# Patient Record
Sex: MALE
[De-identification: names, ages, dates, MRNs, and addresses within clinical notes are randomized per-mention and may not be internally consistent; named-entity substitution may affect disease eponyms.]

---

## 2022-01-28 ENCOUNTER — HOSPITAL ENCOUNTER (OUTPATIENT)
Dept: HOSPITAL 33 - SDC | Age: 47
Discharge: HOME | End: 2022-01-28
Attending: FAMILY MEDICINE
Payer: COMMERCIAL

## 2022-01-28 VITALS — SYSTOLIC BLOOD PRESSURE: 134 MMHG | HEART RATE: 63 BPM | DIASTOLIC BLOOD PRESSURE: 75 MMHG | OXYGEN SATURATION: 98 %

## 2022-01-28 DIAGNOSIS — Z80.9: ICD-10-CM

## 2022-01-28 DIAGNOSIS — K62.5: Primary | ICD-10-CM

## 2022-01-28 NOTE — OP
SURGERY DATE/TIME:  01/28/2022  0802    



PREOPERATIVE DIAGNOSIS:      Rectal bleeding.



POSTOPERATIVE DIAGNOSIS:      Normal colon.



PROCEDURE:    Colonoscopy.



SURGEON:        Dr. Mujica.



ANESTHESIA:    MAC. Medications given by anesthesia department. 



HISTORY: The patient is a 47-year-old white male who reports he has been having 
intermittent rectal bleeding for up to two years. He denies any change in his stools 
otherwise. There is cancer in his family but he does not remember anybody particularly who 
had colon cancer. The patient was felt to need to have endoscopic evaluation and was 
appraised of the risks of the procedure including the risk of perforation, phlebitis, 
untoward reaction to medication, bleeding and missed lesions. The patient verbalized his 
understanding and desired to have the procedure performed.



DESCRIPTION OF PROCEDURE:  The patient was given the medications by the anesthesia 
department. He had continuous pulse oximetry, ECG monitoring, intermittent blood pressure 
monitoring during the examination. He was placed in the left lateral decubitus position. A 
digital rectal examination was performed and revealed normal anal sphincter tone, no 
masses, no significant hemorrhoids and normal prostate.  The flexible Olympus pediatric 
colonoscope was used to intubate the rectum. A view of the colon was developed 
sequentially to the cecum appeared to be approximately 20 cm into the terminal ileum.  
Upon insertion and withdrawal, including a retroflex view in the rectum, no mucosal 
lesions were encountered.  The scope was removed from the patient who tolerated the 
procedure well and was sent back to OP recovery in good condition. The prep was noted to 
be fair to poor.